# Patient Record
Sex: MALE | URBAN - METROPOLITAN AREA
[De-identification: names, ages, dates, MRNs, and addresses within clinical notes are randomized per-mention and may not be internally consistent; named-entity substitution may affect disease eponyms.]

---

## 2023-11-22 ENCOUNTER — EMERGENCY (EMERGENCY)
Facility: HOSPITAL | Age: 63
LOS: 1 days | Discharge: HOME | End: 2023-11-22
Attending: EMERGENCY MEDICINE
Payer: COMMERCIAL

## 2023-11-22 VITALS
WEIGHT: 179.9 LBS | OXYGEN SATURATION: 98 % | TEMPERATURE: 99 F | RESPIRATION RATE: 20 BRPM | SYSTOLIC BLOOD PRESSURE: 139 MMHG | HEART RATE: 78 BPM | HEIGHT: 69 IN | DIASTOLIC BLOOD PRESSURE: 78 MMHG

## 2023-11-22 VITALS
TEMPERATURE: 98 F | SYSTOLIC BLOOD PRESSURE: 135 MMHG | RESPIRATION RATE: 18 BRPM | OXYGEN SATURATION: 98 % | DIASTOLIC BLOOD PRESSURE: 96 MMHG | HEART RATE: 78 BPM

## 2023-11-22 LAB
ALBUMIN SERPL ELPH-MCNC: 4.9 G/DL — SIGNIFICANT CHANGE UP (ref 3.3–5)
ALBUMIN SERPL ELPH-MCNC: 4.9 G/DL — SIGNIFICANT CHANGE UP (ref 3.3–5)
ALP SERPL-CCNC: 88 U/L — SIGNIFICANT CHANGE UP (ref 40–120)
ALP SERPL-CCNC: 88 U/L — SIGNIFICANT CHANGE UP (ref 40–120)
ALT FLD-CCNC: 64 U/L — HIGH (ref 10–45)
ALT FLD-CCNC: 64 U/L — HIGH (ref 10–45)
ANION GAP SERPL CALC-SCNC: 12 MMOL/L — SIGNIFICANT CHANGE UP (ref 5–17)
ANION GAP SERPL CALC-SCNC: 12 MMOL/L — SIGNIFICANT CHANGE UP (ref 5–17)
APTT BLD: 30 SEC — SIGNIFICANT CHANGE UP (ref 24.5–35.6)
APTT BLD: 30 SEC — SIGNIFICANT CHANGE UP (ref 24.5–35.6)
AST SERPL-CCNC: 38 U/L — SIGNIFICANT CHANGE UP (ref 10–40)
AST SERPL-CCNC: 38 U/L — SIGNIFICANT CHANGE UP (ref 10–40)
BASOPHILS # BLD AUTO: 0.05 K/UL — SIGNIFICANT CHANGE UP (ref 0–0.2)
BASOPHILS # BLD AUTO: 0.05 K/UL — SIGNIFICANT CHANGE UP (ref 0–0.2)
BASOPHILS NFR BLD AUTO: 0.5 % — SIGNIFICANT CHANGE UP (ref 0–2)
BASOPHILS NFR BLD AUTO: 0.5 % — SIGNIFICANT CHANGE UP (ref 0–2)
BILIRUB SERPL-MCNC: 0.3 MG/DL — SIGNIFICANT CHANGE UP (ref 0.2–1.2)
BILIRUB SERPL-MCNC: 0.3 MG/DL — SIGNIFICANT CHANGE UP (ref 0.2–1.2)
BUN SERPL-MCNC: 19 MG/DL — SIGNIFICANT CHANGE UP (ref 7–23)
BUN SERPL-MCNC: 19 MG/DL — SIGNIFICANT CHANGE UP (ref 7–23)
CALCIUM SERPL-MCNC: 9.8 MG/DL — SIGNIFICANT CHANGE UP (ref 8.4–10.5)
CALCIUM SERPL-MCNC: 9.8 MG/DL — SIGNIFICANT CHANGE UP (ref 8.4–10.5)
CHLORIDE SERPL-SCNC: 104 MMOL/L — SIGNIFICANT CHANGE UP (ref 96–108)
CHLORIDE SERPL-SCNC: 104 MMOL/L — SIGNIFICANT CHANGE UP (ref 96–108)
CO2 SERPL-SCNC: 23 MMOL/L — SIGNIFICANT CHANGE UP (ref 22–31)
CO2 SERPL-SCNC: 23 MMOL/L — SIGNIFICANT CHANGE UP (ref 22–31)
CREAT SERPL-MCNC: 0.84 MG/DL — SIGNIFICANT CHANGE UP (ref 0.5–1.3)
CREAT SERPL-MCNC: 0.84 MG/DL — SIGNIFICANT CHANGE UP (ref 0.5–1.3)
EGFR: 99 ML/MIN/1.73M2 — SIGNIFICANT CHANGE UP
EGFR: 99 ML/MIN/1.73M2 — SIGNIFICANT CHANGE UP
EOSINOPHIL # BLD AUTO: 0.03 K/UL — SIGNIFICANT CHANGE UP (ref 0–0.5)
EOSINOPHIL # BLD AUTO: 0.03 K/UL — SIGNIFICANT CHANGE UP (ref 0–0.5)
EOSINOPHIL NFR BLD AUTO: 0.3 % — SIGNIFICANT CHANGE UP (ref 0–6)
EOSINOPHIL NFR BLD AUTO: 0.3 % — SIGNIFICANT CHANGE UP (ref 0–6)
GLUCOSE SERPL-MCNC: 152 MG/DL — HIGH (ref 70–99)
GLUCOSE SERPL-MCNC: 152 MG/DL — HIGH (ref 70–99)
HCT VFR BLD CALC: 43.2 % — SIGNIFICANT CHANGE UP (ref 39–50)
HCT VFR BLD CALC: 43.2 % — SIGNIFICANT CHANGE UP (ref 39–50)
HGB BLD-MCNC: 14.4 G/DL — SIGNIFICANT CHANGE UP (ref 13–17)
HGB BLD-MCNC: 14.4 G/DL — SIGNIFICANT CHANGE UP (ref 13–17)
IMM GRANULOCYTES NFR BLD AUTO: 0.4 % — SIGNIFICANT CHANGE UP (ref 0–0.9)
IMM GRANULOCYTES NFR BLD AUTO: 0.4 % — SIGNIFICANT CHANGE UP (ref 0–0.9)
INR BLD: 0.99 RATIO — SIGNIFICANT CHANGE UP (ref 0.85–1.18)
INR BLD: 0.99 RATIO — SIGNIFICANT CHANGE UP (ref 0.85–1.18)
LYMPHOCYTES # BLD AUTO: 1.33 K/UL — SIGNIFICANT CHANGE UP (ref 1–3.3)
LYMPHOCYTES # BLD AUTO: 1.33 K/UL — SIGNIFICANT CHANGE UP (ref 1–3.3)
LYMPHOCYTES # BLD AUTO: 12.8 % — LOW (ref 13–44)
LYMPHOCYTES # BLD AUTO: 12.8 % — LOW (ref 13–44)
MCHC RBC-ENTMCNC: 30.8 PG — SIGNIFICANT CHANGE UP (ref 27–34)
MCHC RBC-ENTMCNC: 30.8 PG — SIGNIFICANT CHANGE UP (ref 27–34)
MCHC RBC-ENTMCNC: 33.3 GM/DL — SIGNIFICANT CHANGE UP (ref 32–36)
MCHC RBC-ENTMCNC: 33.3 GM/DL — SIGNIFICANT CHANGE UP (ref 32–36)
MCV RBC AUTO: 92.3 FL — SIGNIFICANT CHANGE UP (ref 80–100)
MCV RBC AUTO: 92.3 FL — SIGNIFICANT CHANGE UP (ref 80–100)
MONOCYTES # BLD AUTO: 0.44 K/UL — SIGNIFICANT CHANGE UP (ref 0–0.9)
MONOCYTES # BLD AUTO: 0.44 K/UL — SIGNIFICANT CHANGE UP (ref 0–0.9)
MONOCYTES NFR BLD AUTO: 4.2 % — SIGNIFICANT CHANGE UP (ref 2–14)
MONOCYTES NFR BLD AUTO: 4.2 % — SIGNIFICANT CHANGE UP (ref 2–14)
NEUTROPHILS # BLD AUTO: 8.48 K/UL — HIGH (ref 1.8–7.4)
NEUTROPHILS # BLD AUTO: 8.48 K/UL — HIGH (ref 1.8–7.4)
NEUTROPHILS NFR BLD AUTO: 81.8 % — HIGH (ref 43–77)
NEUTROPHILS NFR BLD AUTO: 81.8 % — HIGH (ref 43–77)
NRBC # BLD: 0 /100 WBCS — SIGNIFICANT CHANGE UP (ref 0–0)
NRBC # BLD: 0 /100 WBCS — SIGNIFICANT CHANGE UP (ref 0–0)
PLATELET # BLD AUTO: 259 K/UL — SIGNIFICANT CHANGE UP (ref 150–400)
PLATELET # BLD AUTO: 259 K/UL — SIGNIFICANT CHANGE UP (ref 150–400)
POTASSIUM SERPL-MCNC: 4.6 MMOL/L — SIGNIFICANT CHANGE UP (ref 3.5–5.3)
POTASSIUM SERPL-MCNC: 4.6 MMOL/L — SIGNIFICANT CHANGE UP (ref 3.5–5.3)
POTASSIUM SERPL-SCNC: 4.6 MMOL/L — SIGNIFICANT CHANGE UP (ref 3.5–5.3)
POTASSIUM SERPL-SCNC: 4.6 MMOL/L — SIGNIFICANT CHANGE UP (ref 3.5–5.3)
PROT SERPL-MCNC: 7.6 G/DL — SIGNIFICANT CHANGE UP (ref 6–8.3)
PROT SERPL-MCNC: 7.6 G/DL — SIGNIFICANT CHANGE UP (ref 6–8.3)
PROTHROM AB SERPL-ACNC: 10.4 SEC — SIGNIFICANT CHANGE UP (ref 9.5–13)
PROTHROM AB SERPL-ACNC: 10.4 SEC — SIGNIFICANT CHANGE UP (ref 9.5–13)
RBC # BLD: 4.68 M/UL — SIGNIFICANT CHANGE UP (ref 4.2–5.8)
RBC # BLD: 4.68 M/UL — SIGNIFICANT CHANGE UP (ref 4.2–5.8)
RBC # FLD: 14 % — SIGNIFICANT CHANGE UP (ref 10.3–14.5)
RBC # FLD: 14 % — SIGNIFICANT CHANGE UP (ref 10.3–14.5)
SODIUM SERPL-SCNC: 139 MMOL/L — SIGNIFICANT CHANGE UP (ref 135–145)
SODIUM SERPL-SCNC: 139 MMOL/L — SIGNIFICANT CHANGE UP (ref 135–145)
WBC # BLD: 10.37 K/UL — SIGNIFICANT CHANGE UP (ref 3.8–10.5)
WBC # BLD: 10.37 K/UL — SIGNIFICANT CHANGE UP (ref 3.8–10.5)
WBC # FLD AUTO: 10.37 K/UL — SIGNIFICANT CHANGE UP (ref 3.8–10.5)
WBC # FLD AUTO: 10.37 K/UL — SIGNIFICANT CHANGE UP (ref 3.8–10.5)

## 2023-11-22 PROCEDURE — 73130 X-RAY EXAM OF HAND: CPT | Mod: 26,LT,77

## 2023-11-22 PROCEDURE — 12001 RPR S/N/AX/GEN/TRNK 2.5CM/<: CPT | Mod: F3,XU

## 2023-11-22 PROCEDURE — 85610 PROTHROMBIN TIME: CPT

## 2023-11-22 PROCEDURE — 96374 THER/PROPH/DIAG INJ IV PUSH: CPT | Mod: XU

## 2023-11-22 PROCEDURE — 73130 X-RAY EXAM OF HAND: CPT | Mod: 26,LT

## 2023-11-22 PROCEDURE — 12051 INTMD RPR FACE/MM 2.5 CM/<: CPT

## 2023-11-22 PROCEDURE — 85730 THROMBOPLASTIN TIME PARTIAL: CPT

## 2023-11-22 PROCEDURE — 85025 COMPLETE CBC W/AUTO DIFF WBC: CPT

## 2023-11-22 PROCEDURE — 99285 EMERGENCY DEPT VISIT HI MDM: CPT | Mod: 25

## 2023-11-22 PROCEDURE — 73130 X-RAY EXAM OF HAND: CPT

## 2023-11-22 PROCEDURE — 80053 COMPREHEN METABOLIC PANEL: CPT

## 2023-11-22 PROCEDURE — 70450 CT HEAD/BRAIN W/O DYE: CPT | Mod: MA

## 2023-11-22 PROCEDURE — 70450 CT HEAD/BRAIN W/O DYE: CPT | Mod: 26,MA

## 2023-11-22 PROCEDURE — 36415 COLL VENOUS BLD VENIPUNCTURE: CPT

## 2023-11-22 PROCEDURE — 12011 RPR F/E/E/N/L/M 2.5 CM/<: CPT

## 2023-11-22 PROCEDURE — 99284 EMERGENCY DEPT VISIT MOD MDM: CPT | Mod: 25

## 2023-11-22 RX ORDER — TETANUS TOXOID, REDUCED DIPHTHERIA TOXOID AND ACELLULAR PERTUSSIS VACCINE, ADSORBED 5; 2.5; 8; 8; 2.5 [IU]/.5ML; [IU]/.5ML; UG/.5ML; UG/.5ML; UG/.5ML
0.5 SUSPENSION INTRAMUSCULAR ONCE
Refills: 0 | Status: DISCONTINUED | OUTPATIENT
Start: 2023-11-22 | End: 2023-11-22

## 2023-11-22 RX ORDER — CEFAZOLIN SODIUM 1 G
2000 VIAL (EA) INJECTION EVERY 8 HOURS
Refills: 0 | Status: DISCONTINUED | OUTPATIENT
Start: 2023-11-22 | End: 2023-11-26

## 2023-11-22 RX ORDER — CEPHALEXIN 500 MG
1 CAPSULE ORAL
Qty: 28 | Refills: 0
Start: 2023-11-22 | End: 2023-11-28

## 2023-11-22 RX ADMIN — Medication 100 MILLIGRAM(S): at 14:45

## 2023-11-22 NOTE — ED PROCEDURE NOTE - SKIN SUTURE
5 running stitches with modified corner stitch to approximate v-shaped laceration on the lateral aspect of wound/running

## 2023-11-22 NOTE — ED PROVIDER NOTE - ATTENDING APP SHARED VISIT CONTRIBUTION OF CARE
Arnel Bergman MD:  I personally saw the patient and performed a substantive portion of the visit including all aspects of the medical decision making.    MDM: 68-year-old male who is right-hand dominant, history of diabetes who presents with left hand/finger injury that occurred at work as a  when he was working on a spindle in the part exploded.  Patient reports that metal shrapnel grazed his left temple, and cause injury to his left hands/fingers.  Denies being on antiplatelets or anticoagulants.  Denies any other areas of injury.    ROS: denies LOC, syncope, head injury, neck pain, chest pain, shortness of breath, abdominal pain, back pain, numbness, weakness, vomiting, lightheadedness, vision changes, difficulty walking.    On examination, patient with stable vitals, well-appearing, in no acute distress. Cardiac examination RRR, lungs CTAB, abdomen soft and nontender, neurovascularly intact in all 4 extremities, no focal neurodeficits.  Head is NCAT except for laceration over the frontal lateral scalp.  Left hand with multiple abrasions, but no significantly with a laceration over the fourth digit.  Normal active/passive/resistive range of motion of all digits of the affected hand, neurovascular intact distally with brisk cap refill and normal sensation and motor.  No uptake with fluorescein to bilateral eye.  EOMI without diplopia or discomfort, visual fields intact, normal visual acuity.    Will obtain x-ray of the left hand, will also obtain CT of head due to the mechanism of injury, though patient has no symptoms other than superficial pain at the site of laceration.    Patient found to have open fracture of the fourth phalanx, as well as multiple retained metallic bodies.  Patient was given antibiotics, Tdap, and consulted with hand, obtained preoperative labs in case patient requires further management in the OR.    Patient was seen by orthopedic/hand team who performed laceration repair, see their consult note for further details.    Signed out at 1600 pending CT imaging, finalize and recommendation and close reassessments for further treatment and disposition decisions.      Differential includes but is not limited to: See above    Patient with new problems requiring additional work-up and treatment, following orders: see above  Discussed with: Hand/Ortho team  Obtained and reviewed external records: N/A  Additional history obtained from: Daughter at bedside who also provides translation Turkish  Chronic conditions and social determinants of health affecting care: see above Arnel Bergman MD:  I personally saw the patient and performed a substantive portion of the visit including all aspects of the medical decision making.    MDM: 68-year-old male who is right-hand dominant, history of diabetes who presents with left hand/finger injury that occurred at work as a  when he was working on a spindle in the part exploded.  Patient reports that metal shrapnel grazed his left temple, and cause injury to his left hands/fingers.  Denies being on antiplatelets or anticoagulants.  Denies any other areas of injury.    ROS: denies LOC, syncope, head injury, neck pain, chest pain, shortness of breath, abdominal pain, back pain, numbness, weakness, vomiting, lightheadedness, vision changes, difficulty walking.    On examination, patient with stable vitals, well-appearing, in no acute distress. Cardiac examination RRR, lungs CTAB, abdomen soft and nontender, neurovascularly intact in all 4 extremities, no focal neurodeficits.  Head is NCAT except for laceration over the frontal lateral scalp.  Left hand with multiple abrasions, but no significantly with a laceration over the fourth digit.  Normal active/passive/resistive range of motion of all digits of the affected hand, neurovascular intact distally with brisk cap refill and normal sensation and motor.  No uptake with fluorescein to bilateral eye.  EOMI without diplopia or discomfort, visual fields intact, normal visual acuity.    Will obtain x-ray of the left hand, will also obtain CT of head due to the mechanism of injury, though patient has no symptoms other than superficial pain at the site of laceration.    Patient found to have open fracture of the fourth phalanx, as well as multiple retained metallic bodies.  Patient was given antibiotics, Tdap, and consulted with hand, obtained preoperative labs in case patient requires further management in the OR.    Patient was seen by orthopedic/hand team who performed laceration repair and placed patient in finger splint, see their consult note for further details.    Signed out at 1600 pending repeat hand XR, CT imaging, final Ortho/Hand recommendation and close reassessments for further treatment and disposition decisions.      Differential includes but is not limited to: See above    Patient with new problems requiring additional work-up and treatment, following orders: see above  Discussed with: Hand/Ortho team  Obtained and reviewed external records: N/A  Additional history obtained from: Daughter at bedside who also provides translation Trinidadian  Chronic conditions and social determinants of health affecting care: see above

## 2023-11-22 NOTE — ED PROVIDER NOTE - PHYSICAL EXAMINATION
A&Ox3, NAD, well appearing  NCAT. PERRL, EOMI. No periocular or facial ttp  Extremities: L 3rd digit with approx 0.5cm laceration ot the ulnar aspect just lateral to the nail bed, superficial. approx 3 cm laceration to the mid ulnar aspect of the 4th digit. cap refill <2, pulses in distal extremities 4+, sensations intact throughout, L hand with FAROM of flexion/extension of the digits.   Skin L temple with approx 0.25cm circular laceration with scant bleeding, no FB.   No focal Deficits, Strength 5/5 UE/LE. Gait steady. A&Ox3, NAD, well appearing  NCAT. PERRL, EOMI. No periocular or facial ttp  No fluorescein uptake on Woods lamp evaluation of the eyes.  Extremities: L 3rd digit with approx 0.5cm laceration ot the ulnar aspect just lateral to the nail bed, superficial. approx 3 cm laceration to the mid ulnar aspect of the 4th digit. cap refill <2, pulses in distal extremities 4+, sensations intact throughout, L hand with FAROM of flexion/extension of the digits.   Skin L temple with approx 0.25cm circular laceration with scant bleeding, no FB.   No focal Deficits, Strength 5/5 UE/LE. Gait steady.

## 2023-11-22 NOTE — CONSULT NOTE ADULT - SUBJECTIVE AND OBJECTIVE BOX
63y Male RHD with c/o Left middle phalanx fx of ring finger, open.  Patient works as a  and was heading up a metal part with his right hand while speaking on the phone with the left hand.  The part that was heating up exploded and pieces of the metal hit his left hand and left forehead.  Endorses generalized tingling over ring finger.   No other pain/injuries. Denies fevers/chills. Patient up to date on tetanus as of this morning when he went to urgent care and they sent him into the ED.  sp 2g ancef in ED.    Hx of diabetes, previously poorly controlled, now patient states that HA1c is in around 5%  Smoker, 12 cigarettes a day    HEALTH ISSUES - PROBLEM Dx:        MEDICATIONS  (STANDING):  ceFAZolin   IVPB 2000 milliGRAM(s) IV Intermittent every 8 hours    Allergies    No Known Allergies    Intolerances        Physical Exam  Gen: NAD  LUE: Warm/well perfused, at distal aspect of ring finger. ulnar sided laceration 2.5 cm in lengths over the doral-ulnar side of digit over middle phalanx  Patient sensation intact to light touch, with grossly  intact flexion/extension at PIP/DIP joints. FDS/FDP grossly intact. Endorses generalized tingling in the digit, but 2 point discrimination intact to 6mm over dorsal/palmar radial and ulnar aspect of digit.      Procedure:   L hand was prepped and draped in sterile fashion. 1% lidocaine was used for regional anesthesia of the injured area. The laceration was copiously irrigated and explore. laceration probed down to bone. Irrigated again with 3L of saline. approximated using 4-0 chromic sutures. The laceration was dressed with dry dressings. Dorsal blocking splint placed. The patient tolerated the procedure well. NVI post-procedure.     Imaging  XR Hand: transverse fx of middle phalanx of ring digit                          14.4   10.37 )-----------( 259      ( 22 Nov 2023 14:53 )             43.2     22 Nov 2023 14:53    139    |  104    |  19     ----------------------------<  152    4.6     |  23     |  0.84     Ca    9.8        22 Nov 2023 14:53    TPro  7.6    /  Alb  4.9    /  TBili  0.3    /  DBili  x      /  AST  38     /  ALT  64     /  AlkPhos  88     22 Nov 2023 14:53    Vital Signs Last 24 Hrs  T(C): 36.6 (11-22-23 @ 16:26), Max: 37 (11-22-23 @ 12:53)  T(F): 97.9 (11-22-23 @ 16:26), Max: 98.6 (11-22-23 @ 12:53)  HR: 68 (11-22-23 @ 16:26) (68 - 78)  BP: 115/74 (11-22-23 @ 16:26) (115/74 - 139/78)  BP(mean): --  RR: 18 (11-22-23 @ 16:26) (18 - 20)  SpO2: 97% (11-22-23 @ 16:26) (97% - 98%)    A/P: 63y Male with Left middle phalanx fx of ring finger, open  Pain control  Ice/elevate as needed  Keep dressing clean and dry  Pt to be sent home with antibiotic prophylaxis (Keflex 500mg q6hr)  All question answered  Follow up with Dr. Weinberg as outpatient next tuesday week, call office for appointment   Patient instructed that surgical fixation and repeat I&D would be the recommendation for him  Ortho stable for discharge

## 2023-11-22 NOTE — ED PROVIDER NOTE - NSFOLLOWUPINSTRUCTIONS_ED_ALL_ED_FT
You were seen in the emergency room for evaluation of fracture of your left ring finger with an overlying laceration, as well as a facial laceration.      Follow up with your primary care provider within the next week.     Keep wounds clean and dry, keep splint on finger at all times as directed by orthopedics.    Follow-up with Dr. Weinberg next week on Tuesday 11/28.  Call number below on Monday to make an appointment.    Cisco Weinberg [place]  Orthopaedic Surgery  [address] [place] [address], [address]  [place] [address]  Phone: [phone]    You may take Tylenol and/or Motrin for pain.  Keep your hand elevated while resting and apply ice for 20 minutes at a time as needed throughout the day.     Take Keflex as prescribed.      You should have your sutures on the face removed in 5 days.  You may return here to have them removed, or speak with your primary care doctor to have them remove the sutures.    Return to the emergency department if you start having fevers, chills have redness, discharge/drainage, swelling around any of the wounds, worsening pain and all other concerns.

## 2023-11-22 NOTE — ED PROVIDER NOTE - OBJECTIVE STATEMENT
68-year-old right-hand-dominant male with PMH diabetes here for evaluation of laceration to the left third/fourth digit and left temple.  Patient is a  and was working on a spindle when the part exploded and cut his left temple and left third/fourth digits.  Denies any other injuries, no falls, denies any AC use, no neck pain, eye pain or foreign body sensation of the eye.  Denies any numbness, tingling, paresthesias, denies any vision changes. 68-year-old right-hand-dominant male with PMH diabetes here for evaluation of laceration to the left third/fourth digit and left temple.  Patient is a  and was working on a spindle when the part exploded and cut his left temple and left third/fourth digits.  Denies any other injuries, no falls, denies any AC use, no neck pain, eye pain or foreign body sensation of the eye.  Denies any numbness, tingling, paresthesias, denies any vision changes. Pt went to  who gave him tdap and sent him here for evaluation.

## 2023-11-22 NOTE — ED PROVIDER NOTE - CARE PROVIDER_API CALL
Cisco Weinberg jorge  Orthopaedic Surgery  [address] [place] [address], [facility] [address]  [place] [address]  Phone: [phone]  Fax: [phone]  Follow Up Time: 4-6 Days

## 2023-11-22 NOTE — ED ADULT TRIAGE NOTE - CHIEF COMPLAINT QUOTE
left 4th finger and left side of forehead laceration, work as , "while heating parts of car it exploded"

## 2023-11-22 NOTE — ED ADULT NURSE NOTE - OBJECTIVE STATEMENT
63y M Joel x4 came in for laceration on his left fourth finger and left forehead. Patient reports he is a  and was working on heating a car part today when it exploded and came back at him. Patient has laceration on the fourth left finer on the lateral aspect. Patient has full ROM and sensation of the hand. Patient also has a laceration on the left lateral side of the forehead. Eyes are PERRL. Patient is reporting pain in the left hand. Patient denies headache, blurry vision, dizziness, n/v/d, dysuria, weakness, numbness, tingling, SOB, and chest pain. Daughter is present at bedside. Bed is in lowest position.

## 2023-11-22 NOTE — ED PROVIDER NOTE - IV ALTEPLASE INCLUSION HIDDEN
Therapy plan iron entered per Dr. Gabriel and then had to discontinue because Dr Velasco was putting plan in.   show

## 2023-11-22 NOTE — ED PROVIDER NOTE - CARE PLAN
1 Principal Discharge DX:	Open fracture of middle phalanx of left ring finger  Secondary Diagnosis:	Facial laceration

## 2023-11-22 NOTE — ED PROVIDER NOTE - PATIENT PORTAL LINK FT
You can access the FollowMyHealth Patient Portal offered by [facility] by registering at the following website: [email or web address]. By joining [facility]’s FollowMyHealth portal, you will also be able to view your health information using other applications (apps) compatible with our system.

## 2023-11-25 PROBLEM — Z00.00 ENCOUNTER FOR PREVENTIVE HEALTH EXAMINATION: Status: ACTIVE | Noted: 2023-11-25

## 2023-11-28 ENCOUNTER — APPOINTMENT (OUTPATIENT)
Dept: ORTHOPEDIC SURGERY | Facility: CLINIC | Age: 63
End: 2023-11-28
Payer: COMMERCIAL

## 2023-11-28 ENCOUNTER — NON-APPOINTMENT (OUTPATIENT)
Age: 63
End: 2023-11-28

## 2023-11-28 PROBLEM — Z00.00 ENCOUNTER FOR PREVENTIVE HEALTH EXAMINATION: Status: ACTIVE | Noted: 2023-11-28

## 2023-11-28 PROCEDURE — 73140 X-RAY EXAM OF FINGER(S): CPT | Mod: F3

## 2023-11-28 PROCEDURE — 99203 OFFICE O/P NEW LOW 30 MIN: CPT

## 2023-12-05 ENCOUNTER — LABORATORY RESULT (OUTPATIENT)
Age: 63
End: 2023-12-05

## 2023-12-05 ENCOUNTER — APPOINTMENT (OUTPATIENT)
Dept: ORTHOPEDIC SURGERY | Facility: CLINIC | Age: 63
End: 2023-12-05
Payer: COMMERCIAL

## 2023-12-05 PROCEDURE — 99214 OFFICE O/P EST MOD 30 MIN: CPT | Mod: 25

## 2023-12-05 PROCEDURE — 73140 X-RAY EXAM OF FINGER(S): CPT | Mod: F3

## 2023-12-05 RX ORDER — SULFAMETHOXAZOLE AND TRIMETHOPRIM 800; 160 MG/1; MG/1
800-160 TABLET ORAL
Qty: 40 | Refills: 0 | Status: ACTIVE | COMMUNITY
Start: 2023-12-05 | End: 1900-01-01

## 2023-12-11 LAB — BACTERIA WND CULT: ABNORMAL

## 2023-12-11 RX ORDER — AMPICILLIN 500 MG/1
500 CAPSULE ORAL EVERY 6 HOURS
Qty: 20 | Refills: 0 | Status: ACTIVE | COMMUNITY
Start: 2023-12-11 | End: 1900-01-01

## 2023-12-12 ENCOUNTER — APPOINTMENT (OUTPATIENT)
Dept: ORTHOPEDIC SURGERY | Facility: CLINIC | Age: 63
End: 2023-12-12
Payer: COMMERCIAL

## 2023-12-12 PROCEDURE — 99213 OFFICE O/P EST LOW 20 MIN: CPT | Mod: 25

## 2023-12-12 PROCEDURE — 73140 X-RAY EXAM OF FINGER(S): CPT | Mod: F3

## 2023-12-26 ENCOUNTER — APPOINTMENT (OUTPATIENT)
Dept: ORTHOPEDIC SURGERY | Facility: CLINIC | Age: 63
End: 2023-12-26
Payer: COMMERCIAL

## 2023-12-26 DIAGNOSIS — L03.012 CELLULITIS OF LEFT FINGER: ICD-10-CM

## 2023-12-26 DIAGNOSIS — S62.625D DISPLACED FRACTURE OF MIDDLE PHALANX OF LEFT RING FINGER, SUBSEQUENT ENCOUNTER FOR FRACTURE WITH ROUTINE HEALING: ICD-10-CM

## 2023-12-26 PROCEDURE — 99213 OFFICE O/P EST LOW 20 MIN: CPT | Mod: 25

## 2023-12-26 PROCEDURE — 73140 X-RAY EXAM OF FINGER(S): CPT | Mod: F3

## 2024-02-06 ENCOUNTER — APPOINTMENT (OUTPATIENT)
Dept: ORTHOPEDIC SURGERY | Facility: CLINIC | Age: 64
End: 2024-02-06